# Patient Record
Sex: MALE | Race: WHITE
[De-identification: names, ages, dates, MRNs, and addresses within clinical notes are randomized per-mention and may not be internally consistent; named-entity substitution may affect disease eponyms.]

---

## 2019-11-21 NOTE — NUR
ED Nurse Note:

walked to ed from home c/o injury to right torso playing basketball x 2 days 
pta. pt reports hearing crunching sound when elbowed. states mild dyspnea and 
pain started today. ao4. nad. vss. ambulatory with steady gait

## 2019-11-21 NOTE — EMERGENCY ROOM REPORT
History of Present Illness


General


Chief Complaint:  Pain


Source:  Patient





Present Illness


HPI


29-year-old male presents to the emergency department complaining of 8 out of 

10 severity right-sided rib tenderness and pain with deep breaths or coughing 

x3 days.  Patient reports he was playing basketball and got elbowed in the ribs 

at the site of where he is experiencing pain.  Patient also reports that he 

felt a cracking sensation.  Patient reports that he is a smoker denies history 

of asthma or COPD.  Patient denies fevers, chills, recent illness or 

hemoptysis.  He denies bruising he reports he took 800 mg ibuprofen without 

relief.  Denies any other aggravating or relieving factors at this time. Denies 

palpitations, dizziness or sputum production.


Allergies:  


Coded Allergies:  


     No Known Allergies (Unverified , 11/21/19)





Patient History


Past Medical History:  see triage record


Past Surgical History:  none


Social History:  Reports: smoking


Reviewed Nursing Documentation:  PMH: Agreed; PSxH: Agreed





Nursing Documentation-PMH


Past Medical History:  No Stated History





Review of Systems


All Other Systems:  negative except mentioned in HPI





Physical Exam





Vital Signs








  Date Time  Temp Pulse Resp B/P (MAP) Pulse Ox O2 Delivery O2 Flow Rate FiO2


 


11/21/19 19:06 98.2 81 20 129/73 (91) 97 Room Air  








Sp02 EP Interpretation:  reviewed, normal


General Appearance:  no apparent distress, alert, GCS 15, non-toxic


Head:  normocephalic, atraumatic


Eyes:  bilateral eye normal inspection, bilateral eye PERRL


ENT:  hearing grossly normal, normal voice


Neck:  full range of motion


Respiratory:  lungs clear, normal breath sounds, no rhonchi, no respiratory 

distress, no accessory muscle use, no wheezing, other - TTP to the anterior 

right lower ribs,  No flail chest. no bruises


Cardiovascular #1:  regular rate, rhythm


Gastrointestinal:  non tender, soft


Rectal:  deferred


Genitourinary:  normal inspection


Musculoskeletal:  back normal, normal range of motion, gait/station normal, non-

tender


Neurologic:  alert, motor strength/tone normal, oriented x3, sensory intact, 

responsive, speech normal


Psychiatric:  judgement/insight normal


Lymphatic:  no adenopathy





Medical Decision Making


PA Attestation


Dr. Iqbal is my supervising Physician whom patient management has been 

discussed with.


Diagnostic Impression:  


 Primary Impression:  


 Contusion of rib on right side


 Qualified Codes:  S20.211A - Contusion of right front wall of thorax, initial 

encounter


ER Course


29-year-old male presents to the emergency department complaining of 8 out of 

10 severity right-sided rib tenderness and pain with deep breaths or coughing 

x3 days.  Patient reports he was playing basketball and got elbowed in the ribs 

at the site of where he is experiencing pain.  Patient also reports that he 

felt a cracking sensation.  Patient reports that he is a smoker denies history 

of asthma or COPD.  Patient denies fevers, chills, recent illness or 

hemoptysis.  He denies bruising he reports he took 800 mg ibuprofen without 

relief.  Denies any other aggravating or relieving factors at this time. Denies 

palpitations, dizziness or sputum production. 





Ddx considered but are not limited to Fracture, dislocation, contusion,  Sprain/

Strain/Spasm





Vital signs: are WNL, pt. is afebrile


H&PE are most consistent with musculoskeletal injury will perform imaging to r/

o fractures/dislocations. 





ORDERS:





-  X-ray Right rib series with PA view    - negative for fx, Dislocation, or 

significant soft tissue injury, per preliminary read in ED, and signed by  АЛЕКСАНДР Tate, my supervising physician has reviewed, and agrees with my 

interpretation.





ED INTERVENTIONS:





- Tylenol PO








DISCHARGE: At this time pt. is stable for d/c to home. Will provide printed 

patient care instructions, and any necessary prescriptions. Care plan and 

follow up instructions have been discussed with the patient prior to discharge.


Other X-Ray Diagnostic Results


Other X-Ray Diagnostic Results :  


   X-Ray ordered:  Right Rib series with PA


   # of Views/Limited Vs Complete:  3 View


   Indication:  Pain


   EP Interpretation:  Yes


   PA Xray:  Interpretation reviewed, by supervising MD, and agrees with 

findings.


   Interpretation:  no dislocation, no soft tissue swelling, no fractures


   Impression:  No acute disease


   Electronically Signed by:  Selma Tate





Last Vital Signs








  Date Time  Temp Pulse Resp B/P (MAP) Pulse Ox O2 Delivery O2 Flow Rate FiO2


 


11/21/19 19:18 98.2 81 20 129/73 97 Room Air  








Disposition:  HOME, SELF-CARE


Condition:  Stable


Scripts


Acetaminophen* (TYLENOL EXTRA STRENGTH*) 500 Mg Tablet


500 MG ORAL Q6H, #30 TAB 0 Refills


   Prov: Selma Tate         11/21/19


Referrals:  


[facility] (PCP)


Patient Instructions:  Rib Contusion





Additional Instructions:  


Take medications as directed. 





 ** Follow up with a Primary Care Provider in 3-5 days, even if your symptoms 

have resolved. **


 


--Please review list of primary care clinics, if you do not already have a 

primary care provider





Return sooner to ED if new symptoms occur, or current symptoms become worse. 











- Please note that this Emergency Department Report was dictated using [employer] technology software, occasionally this can lead to 

erroneous entry secondary to interpretation by the dictation equipment.











Selma Tate Nov 21, 2019 19:52

## 2019-11-21 NOTE — NUR
ED Nurse Note:

patient back from imaging. resting comfortably in bed with no acute distress. 
patient reports relief of pain after medication. awaiting imaging results

## 2019-11-21 NOTE — DIAGNOSTIC IMAGING REPORT
Indication: Pain, trauma

 

Technique: One view of the chest, 2 views of the right ribs

 

Comparison: none

 

Findings: Chest demonstrates clear lungs, no infiltrates, effusions, or congestion.

Normal heart size. Rib images demonstrate no evidence of fracture.

 

Impression: Negative

 

This agrees with the preliminary interpretation provided overnight by [employer]

teleradiology service.

## 2019-12-09 ENCOUNTER — HOSPITAL ENCOUNTER (EMERGENCY)
Dept: HOSPITAL 72 - EMR | Age: 29
Discharge: HOME | End: 2019-12-09
Payer: COMMERCIAL

## 2019-12-09 VITALS — BODY MASS INDEX: 22.4 KG/M2 | HEIGHT: 71 IN | WEIGHT: 160 LBS

## 2019-12-09 VITALS — SYSTOLIC BLOOD PRESSURE: 129 MMHG | DIASTOLIC BLOOD PRESSURE: 69 MMHG

## 2019-12-09 VITALS — SYSTOLIC BLOOD PRESSURE: 132 MMHG | DIASTOLIC BLOOD PRESSURE: 73 MMHG

## 2019-12-09 DIAGNOSIS — N20.0: Primary | ICD-10-CM

## 2019-12-09 LAB
ADD MANUAL DIFF: NO
ALBUMIN SERPL-MCNC: 4.2 G/DL (ref 3.4–5)
ALBUMIN/GLOB SERPL: 1.4 {RATIO} (ref 1–2.7)
ALP SERPL-CCNC: 97 U/L (ref 46–116)
ALT SERPL-CCNC: 22 U/L (ref 12–78)
ANION GAP SERPL CALC-SCNC: 11 MMOL/L (ref 5–15)
APPEARANCE UR: (no result)
APTT PPP: (no result) S
AST SERPL-CCNC: 14 U/L (ref 15–37)
BASOPHILS NFR BLD AUTO: 0.8 % (ref 0–2)
BILIRUB SERPL-MCNC: 0.7 MG/DL (ref 0.2–1)
BUN SERPL-MCNC: 20 MG/DL (ref 7–18)
CALCIUM SERPL-MCNC: 8.9 MG/DL (ref 8.5–10.1)
CHLORIDE SERPL-SCNC: 110 MMOL/L (ref 98–107)
CO2 SERPL-SCNC: 24 MMOL/L (ref 21–32)
CREAT SERPL-MCNC: 0.9 MG/DL (ref 0.55–1.3)
EOSINOPHIL NFR BLD AUTO: 2 % (ref 0–3)
ERYTHROCYTE [DISTWIDTH] IN BLOOD BY AUTOMATED COUNT: 9.5 % (ref 11.6–14.8)
GLOBULIN SER-MCNC: 3 G/DL
GLUCOSE UR STRIP-MCNC: NEGATIVE MG/DL
HCT VFR BLD CALC: 42.4 % (ref 42–52)
HGB BLD-MCNC: 15.5 G/DL (ref 14.2–18)
KETONES UR QL STRIP: NEGATIVE
LEUKOCYTE ESTERASE UR QL STRIP: NEGATIVE
LYMPHOCYTES NFR BLD AUTO: 32.7 % (ref 20–45)
MCV RBC AUTO: 80 FL (ref 80–99)
MONOCYTES NFR BLD AUTO: 6.8 % (ref 1–10)
NEUTROPHILS NFR BLD AUTO: 57.7 % (ref 45–75)
NITRITE UR QL STRIP: NEGATIVE
PH UR STRIP: 7 [PH] (ref 4.5–8)
PLATELET # BLD: 263 K/UL (ref 150–450)
POTASSIUM SERPL-SCNC: 3.9 MMOL/L (ref 3.5–5.1)
PROT UR QL STRIP: (no result)
RBC # BLD AUTO: 5.3 M/UL (ref 4.7–6.1)
SODIUM SERPL-SCNC: 145 MMOL/L (ref 136–145)
SP GR UR STRIP: 1.01 (ref 1–1.03)
UROBILINOGEN UR-MCNC: NORMAL MG/DL (ref 0–1)
WBC # BLD AUTO: 8.8 K/UL (ref 4.8–10.8)

## 2019-12-09 PROCEDURE — 96361 HYDRATE IV INFUSION ADD-ON: CPT

## 2019-12-09 PROCEDURE — 80053 COMPREHEN METABOLIC PANEL: CPT

## 2019-12-09 PROCEDURE — 85025 COMPLETE CBC W/AUTO DIFF WBC: CPT

## 2019-12-09 PROCEDURE — 80307 DRUG TEST PRSMV CHEM ANLYZR: CPT

## 2019-12-09 PROCEDURE — 81003 URINALYSIS AUTO W/O SCOPE: CPT

## 2019-12-09 PROCEDURE — 99284 EMERGENCY DEPT VISIT MOD MDM: CPT

## 2019-12-09 PROCEDURE — 96374 THER/PROPH/DIAG INJ IV PUSH: CPT

## 2019-12-09 PROCEDURE — 36415 COLL VENOUS BLD VENIPUNCTURE: CPT

## 2019-12-09 PROCEDURE — 74177 CT ABD & PELVIS W/CONTRAST: CPT

## 2019-12-09 PROCEDURE — 83690 ASSAY OF LIPASE: CPT

## 2019-12-09 NOTE — NUR
ED Nurse Note:



Patient walked in to ed c/o hematuria x 1 day. Reports pain on right lower 
abdomen. Has history kidney stones. Denies any pain while urinating. No SOB. 
Breathing even and unlabored. VSS.

## 2019-12-09 NOTE — EMERGENCY ROOM REPORT
History of Present Illness


General


Chief Complaint:  Male Urogenital Problems


Source:  Patient





Present Illness


HPI


29-year-old male with history of right-sided renal stone x1 year, history of 

alcohol abuse now sober x6 months, and opiate abuse now sober x6 months here 

with mom complaining of new onset of hematuria that started this morning.  

Patient reports that he is currently in a rehab center and had a psychiatrist.  

Denies taking any daily medication.  Denies fever and chills, nausea vomiting, 

painful urination, urinary frequency.  Reports that he started having gross 

hematuria this morning.  Reports that he had unprotected intercourse few days 

ago however denies penile discharge.  Does not want to take prophylactic 

treatment for STDs.  Wants to be tested.


Allergies:  


Coded Allergies:  


     No Known Allergies (Unverified , 11/21/19)





Patient History


Past Medical History:  see triage record


Past Surgical History:  none


Pertinent Family History:  none


Immunizations:  UTD


Reviewed Nursing Documentation:  PMH: Agreed; PSxH: Agreed





Nursing Documentation-PMH


Past Medical History:  No History, Except For





Review of Systems


All Other Systems:  negative except mentioned in HPI





Physical Exam





Vital Signs








  Date Time  Temp Pulse Resp B/P (MAP) Pulse Ox O2 Delivery O2 Flow Rate FiO2


 


12/9/19 19:27 98.4 79 14 132/73 (92) 98 Room Air  








Sp02 EP Interpretation:  reviewed, normal


General Appearance:  no apparent distress, alert, GCS 15, non-toxic


Head:  normocephalic, atraumatic


Eyes:  bilateral eye normal inspection, bilateral eye PERRL


ENT:  hearing grossly normal, normal pharynx, no angioedema, normal voice


Neck:  full range of motion, supple, supple/symm/no masses


Respiratory:  chest non-tender, lungs clear, normal breath sounds, no rhonchi, 

no wheezing, speaking full sentences


Cardiovascular #1:  regular rate, rhythm, no edema, no murmur, normal capillary 

refill


Gastrointestinal:  normal bowel sounds, non tender, soft, no mass, no 

organomegaly, no peritonitis


Genitourinary:  no CVA tenderness


Musculoskeletal:  back normal, no calf tenderness


Neurologic:  alert, motor strength/tone normal, oriented x3, sensory intact, 

responsive, speech normal


Psychiatric:  judgement/insight normal, memory normal, mood/affect normal, no 

suicidal/homicidal ideation


Skin:  no rash


Lymphatic:  no adenopathy





Medical Decision Making


PA Attestation


All diagnoses and treatment plans were reviewed and discussed with my 

supervising physician Dr. Guzman


Diagnostic Impression:  


 Primary Impression:  


 Kidney stone on right side


ER Course


29-year-old male with history of right-sided renal stone x1 year, history of 

alcohol abuse now sober x6 months, and opiate abuse now sober x6 months here 

with mom complaining of new onset of hematuria that started this morning.  

Patient reports that he is currently in a rehab center and had a psychiatrist.  

Denies taking any daily medication.  Denies fever and chills, nausea vomiting, 

painful urination, urinary frequency.  Reports that he started having gross 

hematuria this morning.  Reports that he had unprotected intercourse few days 

ago however denies penile discharge.  Does not want to take prophylactic 

treatment for STDs.  Wants to be tested.





Ddx considered but are not limited to: appendicitis, cholecystis, gastritis, 

gastroenteritis, UTI, pyelonephritis, obstructive renal stone, nonobstructive  

renal stone














Vital signs: are WNL, pt. is afebrile








 H&PE are most consistent with: Nonobstructive renal stone














ORDERS: abdominal CT, abdominal pain set, Flomax, ibuprofen











ED INTERVENTIONS: NS bolus, Zofran























DISCHARGE: At this time pt. is stable for d/c to home. Will provide printed 

patient care instructions, and any necessary prescriptions. Care plan and 

follow up instructions have been discussed with the patient prior to discharge.

  Patient follow-up with urologist, patient has an established family physician 

and will follow-up.  Also advised him to return to emergency room worsening 

symptoms.


CT/MRI/US Diagnostic Results


CT/MRI/US Diagnostic Results :  


   Imaging Test Ordered:  CT abd pelvis


   Impression


9 x 4 x 6 mm stone in right renal pelvis. There is mild dilatation of right 

renal pelvis without calyceal dilatation and there is mild urothelial 

prominence of right renal pelvis. Stone does not appear obstructing at this time

, but intermittent obstruction should be considered.





Mild scarring of left kidney.





Probable focal fatty infiltration along falciform ligament.





Solid organs otherwise unremarkable.





Gallbladder is unremarkable. No biliary ductal dilatation.





Normal appendix.





No free air or free fluid. No bowel obstruction.





Last Vital Signs








  Date Time  Temp Pulse Resp B/P (MAP) Pulse Ox O2 Delivery O2 Flow Rate FiO2


 


12/9/19 19:32 98.4 87 14 132/73 98 Room Air  








Disposition:  HOME, SELF-CARE


Condition:  Stable


Scripts


Ibuprofen (Ibu) 800 Mg Tablet


800 MG PO TID, #30 TAB


   Prov: Cal Chua         12/9/19 


Tamsulosin HCl (Flomax) 0.4 Mg Cap.er.24h


0.4 MG ORAL DAILY for 5 Days, #5 CAP


   Prov: Cal Chua         12/9/19


Patient Instructions:  Kidney Stones, Easy-to-Read





Additional Instructions:  


Take medication as directed, follow-up with a urologist, worsening symptoms 

return to the emergency room.  Increase oral hydration











Cal Chua Dec 9, 2019 21:26

## 2019-12-09 NOTE — NUR
ED Nurse Note:



Pt cleared by ERMD for discharge.  DC instructions/prescription was given and 
explained to pt and verbalized understanding of teachings. All medical deviecs 
such as ID band and IV line removed. Pt is AAO x4, ambulatory and left with all 
personal belongings. Accompanied by family members.

## 2019-12-09 NOTE — DIAGNOSTIC IMAGING REPORT
Clinical Indication: Hematuria x1 day, pain in right lower abdomen, history kidney

stones

 

Technique:   No oral contrast utilized, per emergency room physician request  IV

administration nonionic contrast. Venous phase spiral acquisition obtained through

the abdomen and pelvis. Multiplanar reconstructions were generated. Total dose length

product 862 mGycm. CTDIvol(s) 14 mGy. Dose reduction achieved using automated

exposure control

 

 

Comparison: none

 

Findings: There are what appear to be 2 contiguous calculi versus a single complex

calculus in the right renal pelvis just above the ureteropelvic junction. This

measures 8 mm long axis dimension. There is mild fullness right renal collecting

system. There is some prominent urothelial enhancement of the renal pelvis. There is

very slightly striated renal enhancement pattern bilaterally, but suspect that this

is physiologic related to phase of contrast enhancement.. There is mild right

proximal hydroureter. However, no ureteral calculus is demonstrated. No intrarenal

calculi are demonstrated. No left renal or ureteral calculi, hydronephrosis, or

hydroureter demonstrated. No definite focal renal abnormality.

 

The liver demonstrate fatty infiltration in the usual location adjacent to the

falciform ligament., gallbladder, bile ducts, pancreas are unremarkable. The spleen

measures 13 cm long axis dimension. The adrenals are unremarkable. No retroperitoneal

or mesenteric mass or adenopathy. No pelvic mass or adenopathy.

 

The rectum is mildly distended with stool. No evidence of colonic diverticulosis or

diverticulitis. The appendix is normal. Focally prominent possibly slightly thick

walled small bowel loops are seen in the left upper quadrant. No definite transition

point.. No free or loculated peritoneal gas or fluid is evident.

 

Impression: 8 mm calculus versus 2 contiguous calculi in the right renal pelvis. This

does not appear to result in significant obstruction. However, prominent urothelial

enhancement may indicate pyelitis

 

Slightly striated renal parenchymal enhancement pattern bilaterally. Suspected

physiologic related to phase of contrast enhancement, but the possibility of

nephritis should be considered.

 

Slightly prominent jejunal loops, of doubtful significance but the possibility of

enteritis should be considered

 

Incidental finding focal fatty infiltration in the liver

 

Upper limits of normal spleen size

 

This agrees with the preliminary interpretation provided overnight by [facility]

teleradiology service.

 

The CT scanner at [facility] is accredited by the American College of

Radiology and the scans are performed using protocols designed to limit radiation

exposure to as low as reasonably achievable to attain images of sufficient resolution

adequate for diagnostic evaluation.

## 2020-03-12 ENCOUNTER — HOSPITAL ENCOUNTER (EMERGENCY)
Dept: HOSPITAL 72 - EMR | Age: 30
Discharge: HOME | End: 2020-03-12
Payer: COMMERCIAL

## 2020-03-12 VITALS — WEIGHT: 163 LBS | BODY MASS INDEX: 22.82 KG/M2 | HEIGHT: 71 IN

## 2020-03-12 VITALS — DIASTOLIC BLOOD PRESSURE: 69 MMHG | SYSTOLIC BLOOD PRESSURE: 136 MMHG

## 2020-03-12 VITALS — DIASTOLIC BLOOD PRESSURE: 71 MMHG | SYSTOLIC BLOOD PRESSURE: 135 MMHG

## 2020-03-12 DIAGNOSIS — J11.1: Primary | ICD-10-CM

## 2020-03-12 PROCEDURE — 86710 INFLUENZA VIRUS ANTIBODY: CPT

## 2020-03-12 PROCEDURE — 99283 EMERGENCY DEPT VISIT LOW MDM: CPT

## 2020-03-12 PROCEDURE — 71045 X-RAY EXAM CHEST 1 VIEW: CPT

## 2020-03-12 NOTE — EMERGENCY ROOM REPORT
History of Present Illness


General


Chief Complaint:  Flu Like Symptoms


Source:  Patient





Present Illness


HPI


Disclaimer: Please note that this report is being documented using [employer] 

technology. This can lead to erroneous entry secondary to incorrect 

interpretation by the dictating instrument.





HPI: 29-year-old male presents for evaluation of congestion, sore throat, cough 

and body aches.  Symptoms began 1 to 2 days ago.  He notes generally worsening 

diffuse body aches, nasal congestion, sore throat.  He has a slight cough which 

began today.  He denies fevers or chills.  Denies nausea, vomiting or diarrhea.

  Denies rash.  Did not receive a flu shot this year.  Has not taken any 

medication prior to arrival.


 


PMH: Alcohol abuse, kidney stones


 


PSH: Reviewed


 


Allergies: Denies


 


Social Hx: Former alcohol abuse and rehab


Allergies:  


Coded Allergies:  


     No Known Allergies (Unverified , 3/12/20)





Review of Systems


All Other Systems:  negative except mentioned in HPI





Physical Exam





Vital Signs








  Date Time  Temp Pulse Resp B/P (MAP) Pulse Ox O2 Delivery O2 Flow Rate FiO2


 


3/12/20 02:53 98.2 96 18 136/69 (91) 96 Room Air  





 





General: Awake and alert, no acute distress, afebrile


HEENT: NC/AT. EOMI. PERRLA.  Noninjected sclera.  Uvula midline.  Pharynx is 

erythematous but no edema, no exudate, no vesicles.  Moist mucous membranes


Neck: Supple, trachea midline, no lymphadenopathy


Cardiovascular: RRR.  S1 and S2 normal.  No murmur appreciated


Resp: Normal work of breathing. No cough, wheezing or crackles appreciated


Abdomen: Abdomen is soft, nondistended.  Nontender


Skin: Intact.  No abrasions, laceration or rash over the exposed skin


MSK: Normal tone and bulk. Moving all extremities.  No obvious deformity.


Neuro: Awake and alert.  Mentating appropriately.





Medical Decision Making


Diagnostic Impression:  


 Primary Impression:  


 Influenza-like symptoms


ER Course


29-year-old male presents for evaluation of body aches, upper respiratory 

symptoms and cough.  Patient presentation was consistent with a viral syndrome, 

possibly influenza though pneumonia, bronchitis, pharyngitis, sinusitis are 

also on the differential.  Chest x-ray was ordered as were flu swabs but I do 

not believe the patient requires emergent blood work at this time.  Chest x-ray 

shows no obvious consolidations.  Influenza swabs returned negative.  Patient 

be treated symptomatically with NSAIDs and decongestants.  He can follow-up on 

an outpatient basis with PMD/clinic.  Can return with new or worsening symptoms.





Microbiology








 Date/Time


Source Procedure


Growth Status


 


 


 3/12/20 03:05


Nasal Nares - Final Complete


 


 3/12/20 03:05


Nasal Nares - Final Complete








Chest X-Ray Diagnostic Results


Chest X-Ray Diagnostic Results :  


   Chest X-Ray Ordered:  Yes


   # of Views/Limited/Complete:  1 View


   Indication:  Shortness of Breath


   EP Interpretation:  Yes


   Interpretation:  no consolidation, no effusion, no pneumothorax, no acute 

cardiopulmonary disease


   Impression:  No acute disease


   Electronically Signed by:  Electronically signed by Dr. Scott Guzman





Last Vital Signs








  Date Time  Temp Pulse Resp B/P (MAP) Pulse Ox O2 Delivery O2 Flow Rate FiO2


 


3/12/20 02:53 98.2 96 18 136/69 (91) 96 Room Air  








Disposition:  HOME, SELF-CARE


Condition:  Stable


Scripts


Guaifenesin* (GUAIFENESIN*) 100 Mg/5 Ml Liquid


5 ML ORAL Q6H PRN for For Cough, #120 ML 0 Refills


   Prov: Scott Guzman MD         3/12/20 


Ibuprofen* (MOTRIN*) 600 Mg Tablet


600 MG ORAL Q8H PRN for For Pain, #30 TAB 0 Refills


   Prov: Scott Guzman MD         3/12/20 


Acetaminophen* (TYLENOL EXTRA STRENGTH*) 500 Mg Tablet


500 MG ORAL Q8H PRN for Prn Headache/Temp > 101, #30 TAB 0 Refills


   Prov: Scott Guzman MD         3/12/20


Referrals:  


[facility] (PCP)











Scott Guzman MD Mar 12, 2020 03:10

## 2020-03-12 NOTE — NUR
ED Nurse Note:



Pt walked into ED from rehab facility for c/o flu like symptoms x 2 days, worse 
today. Pt reports body aches, headache, congestion, nausea, decreased appetite, 
sore throat and weakness. Pt denies any recent travel. No SOB. Pt is aaox4, no 
cardiac or respiratory distress noted. Will continue to monitor.